# Patient Record
Sex: MALE | Race: WHITE | NOT HISPANIC OR LATINO | URBAN - NONMETROPOLITAN AREA
[De-identification: names, ages, dates, MRNs, and addresses within clinical notes are randomized per-mention and may not be internally consistent; named-entity substitution may affect disease eponyms.]

---

## 2024-08-12 ENCOUNTER — OFFICE VISIT (OUTPATIENT)
Dept: URGENT CARE | Facility: CLINIC | Age: 59
End: 2024-08-12
Payer: COMMERCIAL

## 2024-08-12 VITALS
WEIGHT: 224 LBS | OXYGEN SATURATION: 96 % | SYSTOLIC BLOOD PRESSURE: 126 MMHG | HEIGHT: 69 IN | TEMPERATURE: 98 F | DIASTOLIC BLOOD PRESSURE: 74 MMHG | HEART RATE: 89 BPM | BODY MASS INDEX: 33.18 KG/M2 | RESPIRATION RATE: 18 BRPM

## 2024-08-12 DIAGNOSIS — H16.001 CORNEAL ULCER OF RIGHT EYE: ICD-10-CM

## 2024-08-12 PROCEDURE — 99203 OFFICE O/P NEW LOW 30 MIN: CPT | Performed by: PHYSICIAN ASSISTANT

## 2024-08-12 PROCEDURE — 3078F DIAST BP <80 MM HG: CPT | Performed by: PHYSICIAN ASSISTANT

## 2024-08-12 PROCEDURE — 3074F SYST BP LT 130 MM HG: CPT | Performed by: PHYSICIAN ASSISTANT

## 2024-08-12 RX ORDER — OFLOXACIN 3 MG/ML
1 SOLUTION/ DROPS OPHTHALMIC 4 TIMES DAILY
Qty: 10 ML | Refills: 0 | Status: SHIPPED | OUTPATIENT
Start: 2024-08-12 | End: 2024-08-19

## 2024-08-12 ASSESSMENT — ENCOUNTER SYMPTOMS
PHOTOPHOBIA: 1
EYE PAIN: 1
BLURRED VISION: 0
EYE REDNESS: 0
EYE DISCHARGE: 1
DOUBLE VISION: 0

## 2024-08-12 ASSESSMENT — VISUAL ACUITY: OU: 1

## 2024-08-12 NOTE — PROGRESS NOTES
"  Subjective:   Davonte Mathews is a 58 y.o. male who presents today with   Chief Complaint   Patient presents with    Eye Problem     X 2 days dust in eyes      Eye Problem   Both eyes are affected. This is a new problem. Episode onset: 2 days. The problem occurs constantly. The problem has been unchanged. The injury mechanism was contact lenses. Associated symptoms include an eye discharge and photophobia. Pertinent negatives include no blurred vision, double vision or eye redness.     Patient states he noticed some dust as he was walking around [place] nights that might have gotten in his eye.  No vision change but has noticed some discharge from the eye.    PMH:  has no past medical history on file.  MEDS:   Current Outpatient Medications:     ofloxacin (OCUFLOX) 0.3 % Solution, Administer 1 Drop into the right eye 4 times a day for 7 days., Disp: 10 mL, Rfl: 0  ALLERGIES: No Known Allergies  SURGHX: No past surgical history on file.  SOCHX:    FH: Reviewed with patient, not pertinent to this visit.     Review of Systems   Eyes:  Positive for photophobia, pain (irritation) and discharge. Negative for blurred vision, double vision and redness.      Objective:   /74   Pulse 89   Temp 36.7 °C (98 °F) (Temporal)   Resp 18   Ht 1.753 m (5' 9\")   Wt 102 kg (224 lb)   SpO2 96%   BMI 33.08 kg/m²   Physical Exam  Vitals and nursing note reviewed.   Constitutional:       General: He is not in acute distress.     Appearance: Normal appearance. He is well-developed. He is not ill-appearing or toxic-appearing.   HENT:      Head: Normocephalic and atraumatic.      Right Ear: Hearing normal.      Left Ear: Hearing normal.   Eyes:      General: Vision grossly intact.         Right eye: No foreign body, discharge or hordeolum.      Extraocular Movements: Extraocular movements intact.      Conjunctiva/sclera:      Right eye: Right conjunctiva is injected. No chemosis, exudate or hemorrhage.     Left eye: Left " conjunctiva is not injected.      Pupils: Pupils are equal, round, and reactive to light.        Comments: Superficial uptake/ulceration noted with fluorescein stain on Woods lamp exam.   Cardiovascular:      Rate and Rhythm: Normal rate.   Pulmonary:      Effort: Pulmonary effort is normal.   Musculoskeletal:      Comments: Normal movement in all 4 extremities   Skin:     General: Skin is warm and dry.   Neurological:      Mental Status: He is alert.      Coordination: Coordination normal.   Psychiatric:         Mood and Affect: Mood normal.         Assessment/Plan:   Assessment    1. Corneal ulcer of right eye  - ofloxacin (OCUFLOX) 0.3 % Solution; Administer 1 Drop into the right eye 4 times a day for 7 days.  Dispense: 10 mL; Refill: 0    Appears most consistent with superficial ulceration/abrasion to the right eye.  Patient will avoid using his contacts and suggest immediate follow-up with his eye specialist once he returns home.  He agrees with this and will call to trying an appointment as early as tomorrow.  He understands to go to the ER with any new or worsening symptoms in the meantime.  Would suggest wearing sunglasses as well if possible.    Differential diagnosis, natural history, supportive care, and indications for immediate follow-up discussed.   Patient given instructions and understanding of medications and treatment.    If not improving in 3-5 days, F/U with PCP or return to  if symptoms worsen.    Patient agreeable to plan.      Please note that this dictation was created using voice recognition software. I have made every reasonable attempt to correct obvious errors, but I expect that there are errors of grammar and possibly content that I did not discover before finalizing the note.    Ashish Brock PA-C